# Patient Record
Sex: FEMALE | Race: WHITE
[De-identification: names, ages, dates, MRNs, and addresses within clinical notes are randomized per-mention and may not be internally consistent; named-entity substitution may affect disease eponyms.]

---

## 2017-10-06 ENCOUNTER — HOSPITAL ENCOUNTER (OUTPATIENT)
Dept: HOSPITAL 17 - CLAB | Age: 47
End: 2017-10-06
Attending: INTERNAL MEDICINE
Payer: MEDICARE

## 2017-10-06 DIAGNOSIS — R19.7: Primary | ICD-10-CM

## 2017-10-06 PROCEDURE — 36415 COLL VENOUS BLD VENIPUNCTURE: CPT

## 2017-10-06 PROCEDURE — 83516 IMMUNOASSAY NONANTIBODY: CPT

## 2017-10-06 PROCEDURE — 86140 C-REACTIVE PROTEIN: CPT

## 2017-10-06 PROCEDURE — 82784 ASSAY IGA/IGD/IGG/IGM EACH: CPT

## 2017-10-09 ENCOUNTER — HOSPITAL ENCOUNTER (OUTPATIENT)
Dept: HOSPITAL 17 - CLAB | Age: 47
End: 2017-10-09
Attending: INTERNAL MEDICINE
Payer: MEDICARE

## 2017-10-09 DIAGNOSIS — R19.7: Primary | ICD-10-CM

## 2017-10-09 LAB — C. DIFF EPI 027: (no result)

## 2017-10-09 PROCEDURE — 87493 C DIFF AMPLIFIED PROBE: CPT

## 2017-10-11 LAB
ENDOMYSIUM AB TITR SER: (no result) {TITER}
IGA SERPL-MCNC: 220 MG/DL (ref 81–463)
TISSUE TRANSGLUTAMINASE AB: (no result) U/ML (ref 0–4)

## 2017-11-06 ENCOUNTER — HOSPITAL ENCOUNTER (EMERGENCY)
Dept: HOSPITAL 17 - NEPK | Age: 47
Discharge: HOME | End: 2017-11-06
Payer: MEDICARE

## 2017-11-06 VITALS
HEART RATE: 114 BPM | OXYGEN SATURATION: 99 % | DIASTOLIC BLOOD PRESSURE: 99 MMHG | TEMPERATURE: 98.9 F | RESPIRATION RATE: 16 BRPM | SYSTOLIC BLOOD PRESSURE: 174 MMHG

## 2017-11-06 DIAGNOSIS — L03.116: Primary | ICD-10-CM

## 2017-11-06 DIAGNOSIS — F17.200: ICD-10-CM

## 2017-11-06 DIAGNOSIS — B95.61: ICD-10-CM

## 2017-11-06 PROCEDURE — 10160 PNXR ASPIR ABSC HMTMA BULLA: CPT

## 2017-11-06 PROCEDURE — 87070 CULTURE OTHR SPECIMN AEROBIC: CPT

## 2017-11-06 PROCEDURE — 87186 SC STD MICRODIL/AGAR DIL: CPT

## 2017-11-06 PROCEDURE — 86403 PARTICLE AGGLUT ANTBDY SCRN: CPT

## 2017-11-06 NOTE — PD
HPI


Chief Complaint:  Skin Problem


Time Seen by Provider:  12:36


Travel History


International Travel<30 days:  No


Contact w/Intl Traveler<30days:  No


Traveled to known affect area:  No





History of Present Illness


HPI


47-year-old female with history of diabetes presents for evaluation of an area 

of painful skin redness to the posterior left lower leg.  She first noticed it 

this morning.  She describes it as a throbbing pain which is constant and worse 

with palpation or manipulation.  Denies any fevers, chills, drainage.  She does 

not recall any bug bites or puncture wounds.  She has no other complaints at 

this time.





PFSH


Past Medical History


Bipolar Disorder:  Yes


Anxiety:  No


Depression:  No


Cancer:  No


Cardiovascular Problems:  Yes (HYPERTENSION)


Diabetes:  Yes


Diminished Hearing:  No


Endocrine:  Yes


Gastrointestinal Disorders:  Yes (GALLSTONES)


Genitourinary:  No


Hepatitis:  No


Hiatal Hernia:  No


Hypertension:  Yes


Immune Disorder:  No


Implanted Vascular Access Dvce:  No


Musculoskeletal:  No


Neurologic:  Yes (NEUROPATHY FEET)


Psychiatric:  Yes (BIPOLAR)


Reproductive:  No


Respiratory:  No


Schizophrenia:  Yes


Thyroid Disease:  No


:  3


Para:  3


Tubal Ligation:  Yes





Past Surgical History


AICD:  No


Endocrine Surgery:  Yes (right toe amputations)


Gynecologic Surgery:  Yes (TUBAL LIG., LEFT OVARY REMOVED)


Joint Replacement:  No


Pacemaker:  No


Other Surgery:  Yes (RIGHT GREAT TOE AMPUTATION 9/10/15)





Social History


Alcohol Use:  No


Tobacco Use:  Yes (1/2 PPD)


Substance Use:  No (PER OLD RECORDS PAST HX OF BENZO AND COCAINE)





Allergies-Medications


(Allergen,Severity, Reaction):  


Coded Allergies:  


     No Known Allergies (Verified , 10/4/15)


Reported Meds & Prescriptions





Reported Meds & Active Scripts


Active


Doxycycline Hyclate 100 Mg Cap 100 Mg PO BID


Klonopin (Clonazepam) 0.5 Mg Tab 0.5 Mg PO HS


Reported


Actos 30 mg (Pioglitazone HCl) 30 Mg Tab 1 Tab PO DAILY


Klonopin (Clonazepam) 1 Mg Tab 1 Mg PO Q6HR


Lamotrigine 150 Mg Tab 300 Mg PO HS


Seroquel  mg (Quetiapine Fumarate) 400 Mg Tab 400 Mg PO HS


Multi-Vitamin Daily (Multivitamins) Daily Tab 1 Tab PO DAILY


Norco  mg (Hydrocodone-Acetaminophen  mg) Acetaminophen 325/10 

Hydrocodone Tab 1 Tab PO Q8HR PRN


Rocephin (Ceftriaxone Sodium) 2 Gm Inj 2 Gm IV DAILY


Janumet  (Sitagliptin Phosphate/Metformin HCl) 1 Tab Tab 1 Tab PO BID


     ADMINISTER WITH MEALS


Lisinopril 5 mg (Lisinopril) 5 Mg Tab 20 Mg PO DAILY


Gabapentin 600 Mg Tab 600 Mg PO QID








Review of Systems


General / Constitutional:  No: Fever, Chills


Skin:  Positive Other (skin redness, pain)





Physical Exam


Narrative


GENERAL: Well-developed well-nourished female in no acute distress


SKIN: Warm and dry.  3 cm circular area of mild erythema to the posterior left 

lower leg.  There is a central pustular lesion.  No fluctuance or drainage.


HEAD: Atraumatic. Normocephalic. 


EYES: Pupils equal and round. No scleral icterus. No injection or drainage. 


ENT: No nasal bleeding or discharge.  Mucous membranes pink and moist.


NECK: Trachea midline. No JVD. 


CARDIOVASCULAR: Regular rate and rhythm.  No murmur appreciated.


RESPIRATORY: No accessory muscle use. Clear to auscultation. Breath sounds 

equal bilaterally. 


MUSCULOSKELETAL: No obvious deformities. No clubbing.  No cyanosis.  No edema. 


NEUROLOGICAL: Awake and alert. No obvious cranial nerve deficits.  Motor 

grossly within normal limits. Normal speech.





Data


Data


Last Documented VS





Vital Signs








  Date Time  Temp Pulse Resp B/P (MAP) Pulse Ox O2 Delivery O2 Flow Rate FiO2


 


17 11:32 98.9 114 16 174/99 (124) 99   








Orders





 Orders


Ed Discharge Order (17 12:36)


Sulfamet-Trimeth Ds 800-160 Mg (Bactrim (17 12:45)


Cephalexin (Keflex) (17 12:45)


Acetaminophen (Tylenol) (17 12:45)


Wound Culture And Gram Stain (17 12:36)








[facility]


Medical Decision Making


Medical Screen Exam Complete:  Yes


Emergency Medical Condition:  Yes


Medical Record Reviewed:  Yes


Differential Diagnosis


Cellulitis, abscess, folliculitis, erysipelas, necrotizing fasciitis


Narrative Course


Examination is consistent with mild cellulitis to the left lower leg with a 

central pustular lesion.  After the patient gave verbal consent the skin was 

prepped with Betadine the pustular lesion was unroofed with an 18-gauge needle.

  A wound culture was performed.  She is being discharged with doxycycline.





Diagnosis





 Primary Impression:  


 Cellulitis of left lower leg





***Additional Instructions:  


Antibiotics as prescribed.  Warm compresses several times a day 15 minutes at a 

time.  Follow-up with primary care physician and return for any acutely new or 

worsening symptoms.


***Med/Other Pt SpecificInfo:  Prescription(s) given


Scripts


Doxycycline Hyclate (Doxycycline Hyclate) 100 Mg Cap


100 MG PO BID for Infection, #20 CAP 0 Refills


   Prov: Kaden Mercado MD         17


Disposition:  01 DISCHARGE HOME


Condition:  Stable











[place] 2017 12:39

## 2017-12-18 ENCOUNTER — HOSPITAL ENCOUNTER (OUTPATIENT)
Dept: HOSPITAL 17 - CLAB | Age: 47
End: 2017-12-18
Payer: MEDICARE

## 2017-12-18 DIAGNOSIS — M06.9: ICD-10-CM

## 2017-12-18 DIAGNOSIS — M86.671: Primary | ICD-10-CM

## 2017-12-18 PROCEDURE — 85652 RBC SED RATE AUTOMATED: CPT

## 2017-12-18 PROCEDURE — 86140 C-REACTIVE PROTEIN: CPT

## 2017-12-18 PROCEDURE — 36415 COLL VENOUS BLD VENIPUNCTURE: CPT

## 2018-06-19 ENCOUNTER — HOSPITAL ENCOUNTER (OUTPATIENT)
Dept: HOSPITAL 17 - NEPC | Age: 48
Setting detail: OBSERVATION
LOS: 1 days | Discharge: HOME | End: 2018-06-20
Attending: INTERNAL MEDICINE | Admitting: INTERNAL MEDICINE
Payer: MEDICARE

## 2018-06-19 VITALS — WEIGHT: 286.6 LBS | BODY MASS INDEX: 43.44 KG/M2 | HEIGHT: 68 IN

## 2018-06-19 VITALS
HEART RATE: 106 BPM | TEMPERATURE: 98.7 F | DIASTOLIC BLOOD PRESSURE: 89 MMHG | OXYGEN SATURATION: 94 % | SYSTOLIC BLOOD PRESSURE: 153 MMHG | RESPIRATION RATE: 26 BRPM

## 2018-06-19 VITALS
HEART RATE: 89 BPM | SYSTOLIC BLOOD PRESSURE: 146 MMHG | DIASTOLIC BLOOD PRESSURE: 94 MMHG | OXYGEN SATURATION: 94 % | TEMPERATURE: 98 F | RESPIRATION RATE: 17 BRPM

## 2018-06-19 VITALS
SYSTOLIC BLOOD PRESSURE: 143 MMHG | RESPIRATION RATE: 18 BRPM | HEART RATE: 98 BPM | OXYGEN SATURATION: 97 % | DIASTOLIC BLOOD PRESSURE: 74 MMHG

## 2018-06-19 VITALS — OXYGEN SATURATION: 97 %

## 2018-06-19 VITALS — HEART RATE: 82 BPM

## 2018-06-19 DIAGNOSIS — M47.894: ICD-10-CM

## 2018-06-19 DIAGNOSIS — Z79.899: ICD-10-CM

## 2018-06-19 DIAGNOSIS — F17.200: ICD-10-CM

## 2018-06-19 DIAGNOSIS — F31.9: ICD-10-CM

## 2018-06-19 DIAGNOSIS — E11.9: ICD-10-CM

## 2018-06-19 DIAGNOSIS — R00.0: ICD-10-CM

## 2018-06-19 DIAGNOSIS — R07.89: Primary | ICD-10-CM

## 2018-06-19 DIAGNOSIS — I10: ICD-10-CM

## 2018-06-19 DIAGNOSIS — F20.9: ICD-10-CM

## 2018-06-19 LAB
BASOPHILS # BLD AUTO: 0 TH/MM3 (ref 0–0.2)
BASOPHILS NFR BLD: 0.4 % (ref 0–2)
BUN SERPL-MCNC: 11 MG/DL (ref 7–18)
CALCIUM SERPL-MCNC: 8.8 MG/DL (ref 8.5–10.1)
CHLORIDE SERPL-SCNC: 97 MEQ/L (ref 98–107)
CREAT SERPL-MCNC: 0.89 MG/DL (ref 0.5–1)
EOSINOPHIL # BLD: 0 TH/MM3 (ref 0–0.4)
EOSINOPHIL NFR BLD: 0.2 % (ref 0–4)
ERYTHROCYTE [DISTWIDTH] IN BLOOD BY AUTOMATED COUNT: 13.6 % (ref 11.6–17.2)
GFR SERPLBLD BASED ON 1.73 SQ M-ARVRAT: 68 ML/MIN (ref 89–?)
GLUCOSE SERPL-MCNC: 381 MG/DL (ref 74–106)
HCO3 BLD-SCNC: 29.1 MEQ/L (ref 21–32)
HCT VFR BLD CALC: 45.7 % (ref 35–46)
HGB BLD-MCNC: 15.6 GM/DL (ref 11.6–15.3)
INR PPP: 0.9 RATIO
LYMPHOCYTES # BLD AUTO: 2.7 TH/MM3 (ref 1–4.8)
LYMPHOCYTES NFR BLD AUTO: 28.7 % (ref 9–44)
MCH RBC QN AUTO: 32.4 PG (ref 27–34)
MCHC RBC AUTO-ENTMCNC: 34.1 % (ref 32–36)
MCV RBC AUTO: 95.2 FL (ref 80–100)
MONOCYTE #: 0.5 TH/MM3 (ref 0–0.9)
MONOCYTES NFR BLD: 4.9 % (ref 0–8)
NEUTROPHILS # BLD AUTO: 6.3 TH/MM3 (ref 1.8–7.7)
NEUTROPHILS NFR BLD AUTO: 65.8 % (ref 16–70)
PLATELET # BLD: 249 TH/MM3 (ref 150–450)
PMV BLD AUTO: 8.3 FL (ref 7–11)
PROTHROMBIN TIME: 9.5 SEC (ref 9.8–11.6)
RBC # BLD AUTO: 4.8 MIL/MM3 (ref 4–5.3)
SODIUM SERPL-SCNC: 134 MEQ/L (ref 136–145)
TROPONIN I SERPL-MCNC: (no result) NG/ML (ref 0.02–0.05)
TROPONIN I SERPL-MCNC: (no result) NG/ML (ref 0.02–0.05)
WBC # BLD AUTO: 9.5 TH/MM3 (ref 4–11)

## 2018-06-19 PROCEDURE — 78452 HT MUSCLE IMAGE SPECT MULT: CPT

## 2018-06-19 PROCEDURE — 80048 BASIC METABOLIC PNL TOTAL CA: CPT

## 2018-06-19 PROCEDURE — 93005 ELECTROCARDIOGRAM TRACING: CPT

## 2018-06-19 PROCEDURE — 85610 PROTHROMBIN TIME: CPT

## 2018-06-19 PROCEDURE — 85379 FIBRIN DEGRADATION QUANT: CPT

## 2018-06-19 PROCEDURE — A9502 TC99M TETROFOSMIN: HCPCS

## 2018-06-19 PROCEDURE — 85025 COMPLETE CBC W/AUTO DIFF WBC: CPT

## 2018-06-19 PROCEDURE — 82948 REAGENT STRIP/BLOOD GLUCOSE: CPT

## 2018-06-19 PROCEDURE — 71275 CT ANGIOGRAPHY CHEST: CPT

## 2018-06-19 PROCEDURE — 82550 ASSAY OF CK (CPK): CPT

## 2018-06-19 PROCEDURE — 85730 THROMBOPLASTIN TIME PARTIAL: CPT

## 2018-06-19 PROCEDURE — 99285 EMERGENCY DEPT VISIT HI MDM: CPT

## 2018-06-19 PROCEDURE — 84484 ASSAY OF TROPONIN QUANT: CPT

## 2018-06-19 PROCEDURE — 71045 X-RAY EXAM CHEST 1 VIEW: CPT

## 2018-06-19 PROCEDURE — G0378 HOSPITAL OBSERVATION PER HR: HCPCS

## 2018-06-19 PROCEDURE — 93017 CV STRESS TEST TRACING ONLY: CPT

## 2018-06-19 RX ADMIN — Medication SCH ML: at 23:05

## 2018-06-19 RX ADMIN — PREGABALIN SCH MG: 25 CAPSULE ORAL at 23:02

## 2018-06-19 NOTE — RADRPT
EXAM DATE:  6/19/2018 5:30 PM EDT

AGE/SEX:        48 years / Female



INDICATIONS:  Chest pain with bilateral feet swelling.



CLINICAL DATA:  This is the patient's initial encounter. Patient reports that signs and symptoms have
 been present for 2 days and indicates a pain score of 8/10. 

                                                                          

MEDICAL/SURGICAL HISTORY:       Hypertension.  Diabetes mellitus type II. Smoker.  None.



COMPARISON:      [facility], CHEST SINGLE AP, 9/4/2015.  . 





FINDINGS:  

A single AP view of the chest demonstrates the lungs to be symmetrically aerated without evidence of 
mass, infiltrate or effusion.  The cardiomediastinal contours are unremarkable.  Osseous structures a
re intact.  





CONCLUSION: 

No acute intrathoracic disease. Stable examination.



Electronically signed by: Dennis Cornelius MD  6/19/2018 5:33 PM EDT

## 2018-06-19 NOTE — PD
HPI


Chief Complaint:  Chest Pain


Time Seen by Provider:  16:13


Travel History


International Travel<30 days:  No


Contact w/Intl Traveler<30days:  No


Traveled to known affect area:  No





History of Present Illness


HPI


The patient was seen and examined in the presence of the nurse.  This patient 

complains of sternal pressure and heaviness.  Duration 2 days.  Severity is 

moderate.  No alleviating factors.  No exacerbating factors.  It is not 

exertional.  She has no history of cardiac disease.  She is a hypertensive 

diabetic however.  No history of stress testing





PFSH


Past Medical History


Bipolar Disorder:  Yes


Anxiety:  No


Depression:  No


Cancer:  No


Cardiovascular Problems:  Yes (HYPERTENSION)


Diabetes:  Yes


Diminished Hearing:  No


Endocrine:  Yes


Gastrointestinal Disorders:  Yes (GALLSTONES)


Genitourinary:  No


Hepatitis:  No


Hiatal Hernia:  No


Hypertension:  Yes


Immune Disorder:  No


Implanted Vascular Access Dvce:  No


Musculoskeletal:  No


Neurologic:  Yes (NEUROPATHY FEET)


Psychiatric:  Yes (BIPOLAR)


Reproductive:  No


Respiratory:  No


Schizophrenia:  Yes


Thyroid Disease:  No


Pregnant?:  Not Pregnant


:  3


Para:  3


Tubal Ligation:  Yes





Past Surgical History


AICD:  No


Endocrine Surgery:  Yes (right toe amputations)


Gynecologic Surgery:  Yes (TUBAL LIG., LEFT OVARY REMOVED)


Joint Replacement:  No


Pacemaker:  No


Other Surgery:  Yes (RIGHT GREAT TOE AMPUTATION 9/10/15)





Social History


Alcohol Use:  No


Tobacco Use:  Yes (1/2 PPD)


Substance Use:  No (PER OLD RECORDS PAST HX OF BENZO AND COCAINE)





Allergies-Medications


(Allergen,Severity, Reaction):  


Coded Allergies:  


     No Known Allergies (Verified  Adverse Reaction, Unknown, 18)


Reported Meds & Prescriptions





Reported Meds & Active Scripts


Active


Reported


Multiple Vitamin 1 Tab 1 Tab PO DAILY


Janumet (Sitagliptin-Metformin) 50-1,000 Mg Tab 1 Tab PO BID


Actos (Pioglitazone HCl) 30 Mg Tab 30 Mg PO DAILY


Lisinopril 40 Mg Tab 40 Mg PO DAILY








Review of Systems


General / Constitutional:  No: Fever


Eyes:  No: Visual changes


HENT:  No: Headaches


Cardiovascular:  Positive: Chest Pain or Discomfort


Respiratory:  No: Shortness of Breath


Gastrointestinal:  No: Abdominal Pain


Genitourinary:  No: Dysuria


Musculoskeletal:  No: Pain


Skin:  No Rash


Neurologic:  No: Weakness


Psychiatric:  No: Depression


Endocrine:  No: Polydipsia


Hematologic/Lymphatic:  No: Easy Bruising





Physical Exam


Narrative


GENERAL: Well-nourished, well-developed patient in no apparent distress.


SKIN: Focused skin assessment reveals no rash and nodules. Skin is Warm and dry.


HEAD: Atraumatic. Normocephalic. 


EYES: Pupils equal and round. No scleral icterus. No injection or drainage. 


ENT: No nasal bleeding or discharge.  Mucous membranes pink and moist.


NECK: Trachea midline. No JVD. 


CARDIOVASCULAR: Regular rate and rhythm.  No murmur appreciated.


RESPIRATORY: No accessory muscle use. Clear to auscultation. Breath sounds 

equal bilaterally. 


GASTROINTESTINAL: Abdomen soft, non-tender, nondistended. Hepatic and splenic 

margins not palpable. 


MUSCULOSKELETAL: Has a partial amputation of the right great toe. No clubbing.  

No cyanosis.  No edema. 


NEUROLOGICAL: Awake and alert. No obvious cranial nerve deficits.  Motor 

grossly within normal limits. Normal speech.


PSYCHIATRIC: Appropriate mood and affect; insight and judgment normal.





Data


Data


Last Documented VS





Vital Signs








  Date Time  Temp Pulse Resp B/P (MAP) Pulse Ox O2 Delivery O2 Flow Rate FiO2


 


18 16:03 98.7 106 26 153/89 (110) 94   








Orders





 Orders


Electrocardiogram (18 16:53)


Basic Metabolic Panel (Bmp) (18 16:53)


Ckmb (Isoenzyme) Profile (18 16:53)


Complete Blood Count With Diff (18 16:53)


Prothrombin Time / Inr (Pt) (18 16:53)


Act Partial Throm Time (Ptt) (18 16:53)


Troponin I (18 16:53)


Chest, Single Ap (18 16:53)


Ecg Monitoring (18 16:53)


Iv Access Insert/Monitor (18 16:53)


Oximetry (18 16:53)


Aspirin (Aspirin) (18 17:00)


Sodium Chloride 0.9% Flush (Ns Flush) (18 17:00)


Admit Order (Ed Use Only) (18 18:27)





Labs





Laboratory Tests








Test


  18


16:50


 


White Blood Count 9.5 TH/MM3 


 


Red Blood Count 4.80 MIL/MM3 


 


Hemoglobin 15.6 GM/DL 


 


Hematocrit 45.7 % 


 


Mean Corpuscular Volume 95.2 FL 


 


Mean Corpuscular Hemoglobin 32.4 PG 


 


Mean Corpuscular Hemoglobin


Concent 34.1 % 


 


 


Red Cell Distribution Width 13.6 % 


 


Platelet Count 249 TH/MM3 


 


Mean Platelet Volume 8.3 FL 


 


Neutrophils (%) (Auto) 65.8 % 


 


Lymphocytes (%) (Auto) 28.7 % 


 


Monocytes (%) (Auto) 4.9 % 


 


Eosinophils (%) (Auto) 0.2 % 


 


Basophils (%) (Auto) 0.4 % 


 


Neutrophils # (Auto) 6.3 TH/MM3 


 


Lymphocytes # (Auto) 2.7 TH/MM3 


 


Monocytes # (Auto) 0.5 TH/MM3 


 


Eosinophils # (Auto) 0.0 TH/MM3 


 


Basophils # (Auto) 0.0 TH/MM3 


 


CBC Comment DIFF FINAL 


 


Differential Comment  


 


Prothrombin Time 9.5 SEC 


 


Prothromb Time International


Ratio 0.9 RATIO 


 


 


Activated Partial


Thromboplast Time 27.3 SEC 


 


 


Blood Urea Nitrogen 11 MG/DL 


 


Creatinine 0.89 MG/DL 


 


Random Glucose 381 MG/DL 


 


Calcium Level 8.8 MG/DL 


 


Sodium Level 134 MEQ/L 


 


Potassium Level 4.4 MEQ/L 


 


Chloride Level 97 MEQ/L 


 


Carbon Dioxide Level 29.1 MEQ/L 


 


Anion Gap 8 MEQ/L 


 


Estimat Glomerular Filtration


Rate 68 ML/MIN 


 


 


Total Creatine Kinase 97 U/L 


 


Troponin I


  LESS THAN 0.02


NG/ML











MDM


Medical Decision Making


Medical Screen Exam Complete:  Yes


Emergency Medical Condition:  Yes


Medical Record Reviewed:  Yes


Differential Diagnosis


Differential diagnosis includes MI, angina, pericarditis, pleurisy, GERD, 

anxiety.


Narrative Course


I have reviewed the patient's electronic medical record.





I gave her an aspirin


IV placed and labs sent


I reviewed her chest x-ray which is negative


I reviewed her EKG which shows sinus rhythm and no ST elevation





Lab studies are normal including metabolic studies and CBC and cardiac enzymes


She has multiple risk factors including hypertension and diabetes.  She will be 

a 23 hour observation in the chest pain center in order to rule out cardiac 

cause of her symptoms.





Diagnosis





 Primary Impression:  


 Chest pain


 Qualified Codes:  R07.9 - Chest pain, unspecified





Admitting Information


Admitting Physician Requests:  Observation











Reid Person MD 2018 17:20

## 2018-06-20 VITALS — HEART RATE: 83 BPM

## 2018-06-20 VITALS — HEART RATE: 86 BPM

## 2018-06-20 VITALS
SYSTOLIC BLOOD PRESSURE: 179 MMHG | TEMPERATURE: 98.2 F | DIASTOLIC BLOOD PRESSURE: 96 MMHG | HEART RATE: 90 BPM | OXYGEN SATURATION: 95 % | RESPIRATION RATE: 18 BRPM

## 2018-06-20 VITALS
RESPIRATION RATE: 18 BRPM | OXYGEN SATURATION: 96 % | SYSTOLIC BLOOD PRESSURE: 168 MMHG | HEART RATE: 92 BPM | TEMPERATURE: 98.8 F | DIASTOLIC BLOOD PRESSURE: 97 MMHG

## 2018-06-20 VITALS
SYSTOLIC BLOOD PRESSURE: 147 MMHG | DIASTOLIC BLOOD PRESSURE: 87 MMHG | TEMPERATURE: 98.5 F | RESPIRATION RATE: 18 BRPM | HEART RATE: 87 BPM | OXYGEN SATURATION: 95 %

## 2018-06-20 VITALS
HEART RATE: 92 BPM | SYSTOLIC BLOOD PRESSURE: 139 MMHG | OXYGEN SATURATION: 95 % | TEMPERATURE: 98.2 F | RESPIRATION RATE: 16 BRPM | DIASTOLIC BLOOD PRESSURE: 91 MMHG

## 2018-06-20 VITALS — HEART RATE: 93 BPM

## 2018-06-20 VITALS
TEMPERATURE: 98.2 F | DIASTOLIC BLOOD PRESSURE: 90 MMHG | HEART RATE: 88 BPM | SYSTOLIC BLOOD PRESSURE: 145 MMHG | RESPIRATION RATE: 18 BRPM | OXYGEN SATURATION: 94 %

## 2018-06-20 VITALS — HEART RATE: 91 BPM

## 2018-06-20 LAB — TROPONIN I SERPL-MCNC: (no result) NG/ML (ref 0.02–0.05)

## 2018-06-20 RX ADMIN — INSULIN ASPART SCH: 100 INJECTION, SOLUTION INTRAVENOUS; SUBCUTANEOUS at 12:41

## 2018-06-20 RX ADMIN — INSULIN ASPART SCH: 100 INJECTION, SOLUTION INTRAVENOUS; SUBCUTANEOUS at 08:00

## 2018-06-20 RX ADMIN — INSULIN ASPART SCH: 100 INJECTION, SOLUTION INTRAVENOUS; SUBCUTANEOUS at 18:29

## 2018-06-20 RX ADMIN — Medication SCH ML: at 08:11

## 2018-06-20 RX ADMIN — PREGABALIN SCH MG: 25 CAPSULE ORAL at 12:57

## 2018-06-20 RX ADMIN — PREGABALIN SCH MG: 25 CAPSULE ORAL at 08:11

## 2018-06-20 RX ADMIN — PREGABALIN SCH MG: 25 CAPSULE ORAL at 18:29

## 2018-06-20 NOTE — EKG
Date Performed: 06/19/2018       Time Performed: 23:12:50

 

PTAGE:      48 years

 

EKG:      Sinus rhythm 

 

 NORMAL ECG

 

PREVIOUS TRACING       : 06/19/2018 20.12 Since previous tracing, no significant change noted

 

DOCTOR:   Ankit Ward  Interpretating Date/Time  06/20/2018 14:12:46

## 2018-06-20 NOTE — HHI.HP
HPI


Primary Care Physician


Alban Felder MD


Chief Complaint


Chest pain


History of Present Illness


48-year-old female with history of type 2 diabetes, hypertension, and current 

smoker presents emergency room for further evaluation nonexertional chest pain.

  Onset 3 days intermittently, increasing in intensity and severity.  Location 

substernal.  Characterized as pressure.  Radiation to mid back.  Duration 30-60 

seconds.  Associated symptoms of dyspnea.  Denies nausea, vomiting, 

diaphoresis.  Denies similar pain in the past.  No known precipitating or 

relieving factors.  No known coronary artery disease.  No recent illness, fever

, chills, or injury.





Review of Systems


General: No fatigue, weakness, fever, chills, or recent illness.


HEENT: No HA, no vision changes, no nasal congestion or drainage, no dysphasia


CV: As stated above.  No current chest pain or pressure no CP.


RESP: No SOB, cough, wheeze, or recent URI.  No history of asthma or COPD.  

Current smoker


GI: No nausea, vomiting, bowel changes, diarrhea, constipation, pain, or 

distention.  No unintentional weight gain or weight loss.


: No dysuria, urgency, frequency


EXT: No lower leg edema, history of bilateral neuropathy


MS: No discomfort, injury or change in ROM


NEURO: No dizziness, difficulty with balance, LOC, motor/sensory deficits


PSYCH: No anxiety, depression, or suicidal ideation


SKIN: No rashes, no concerning lesions





Past Family Social History


Allergies:  


Coded Allergies:  


     No Known Allergies (Verified  Allergy, Unknown, 18)


Past Medical History


Type 2 diabetes, hypertension, neuropathy, bipolar disorder, gallstones


Past Surgical History


Tubal ligation, cholecystectomy, lumbar surgery 2, right toe amputation, left 

ovarian removal


Reported Medications





Reported Meds & Active Scripts


Active


Reported


Tramadol (Tramadol HCl) 50 Mg Tab 50 Mg PO Q8H PRN


Lyrica (Pregabalin) 50 Mg Cap 50 Mg PO TID


Trileptal (Oxcarbazepine) 600 Mg Tab 600 Mg PO BID


Omeprazole 20 Mg Tab 20 Mg PO DAILY


Fanapt (Iloperidone) 6 Mg Tab 1 Tab PO HS


Glipizide 10 Mg Tab 10 Mg PO BIDAC


     Take 30 minutes before a meal


Multiple Vitamin 1 Tab 1 Tab PO DAILY


Janumet (Sitagliptin-Metformin) 50-1,000 Mg Tab 1 Tab PO BID


Actos (Pioglitazone HCl) 30 Mg Tab 30 Mg PO DAILY


Lisinopril 40 Mg Tab 40 Mg PO DAILY


Active Ordered Medications





Current Medications








 Medications


  (Trade)  Dose


 Ordered  Sig/Kun


 Route  Start Time


 Stop Time Status Last Admin


 


  (NS Flush)  2 ml  UNSCH  PRN


 IV FLUSH  18 18:45


     


 


 


  (NS Flush)  2 ml  BID


 IV FLUSH  18 21:00


    18 08:11


 


 


  (Lyrica)  50 mg  TID


 PO  18 22:30


    18 08:11


 


 


  (Morphine Inj)  4 mg  Q4H  PRN


 IV  18 23:00


     


 


 


  (Tylenol)  325 mg  Q6H  PRN


 PO  18 23:30


     


 


 


  (Ultram)  50 mg  Q8H  PRN


 PO  18 23:30


     


 


 


  (D50w (Vial) Inj)  50 ml  UNSCH  PRN


 IV PUSH  18 07:30


     


 


 


  (Glucagon Inj)  1 mg  UNSCH  PRN


 OTHER  18 07:30


     


 


 


  (NovoLOG


 SUPPLEMENTAL


 SCALE)  1  ACHS SLIDING  SCALE


 SQ  18 08:00


     


 








Social History


Known type 2 diabetes and hypertension.  No known hyperlipidemia, not on statin 

therapy.  No known coronary artery disease.


Current smoker decreased from 1.5 packs daily to quarter pack daily.  Denies 

any alcohol or illegal drug use.





Past cardiac testing


None





Physical Exam


Vital Signs





Vital Signs








  Date Time  Temp Pulse Resp B/P (MAP) Pulse Ox O2 Delivery O2 Flow Rate FiO2


 


18 08:17 98.2 90 18 179/96 (123) 95   


 


18 03:48 98.2 92 16 139/91 (107) 95   


 


18 03:46  86      


 


18 00:19   18     


 


18 00:14 98.2 88 18 145/90 (108) 94   


 


18 23:17  82      


 


18 21:17     97   


 


18 20:17 98.0 89 17 146/94 (111) 94   


 


18 19:13        


 


18 18:00  98 18 143/74 (97) 97   


 


18 16:30      Room Air  


 


18 16:03 98.7 106 26 153/89 (110) 94   








Physical Exam


GENERAL: Alert WN, WD, NAD, pleasant, morbidly obese  female who 

appears older than stated age


HEAD: NC, AT


EYES: Sclera clear, conjunctiva without injection, pupils equal and round


ENT: Mucous membranes pink and moist


CV: RRR, without murmur, rub, or gallop, no JVD, no S3-S4.


RESP: Clear lungs throughout bilateral, no crackles, wheeze, rhonchi, 

symmetrical chest rise, nonlabored, able to speak in full sentences


ABD: Soft, NT, ND, no masses, positive bowel tones, large, obese


EXT: Pulses +2x4, +1 pitting bilateral lower extremity edema, right toe 

amputation surgical scar


MS: Normal tone x4 extremities, nontender, no obvious deformities, full range 

of motion


NEURO: CN II through CN XII grossly intact, motor strength 5/5


PSYCH: A+O x3, pleasant affect, appropriate speech, mood, insight and judgment


SKIN: Normal turgor, normal texture


Laboratory





Laboratory Tests








Test


  18


16:50 18


20:20 18


23:08


 


White Blood Count 9.5   


 


Red Blood Count 4.80   


 


Hemoglobin 15.6   


 


Hematocrit 45.7   


 


Mean Corpuscular Volume 95.2   


 


Mean Corpuscular Hemoglobin 32.4   


 


Mean Corpuscular Hemoglobin


Concent 34.1 


  


  


 


 


Red Cell Distribution Width 13.6   


 


Platelet Count 249   


 


Mean Platelet Volume 8.3   


 


Neutrophils (%) (Auto) 65.8   


 


Lymphocytes (%) (Auto) 28.7   


 


Monocytes (%) (Auto) 4.9   


 


Eosinophils (%) (Auto) 0.2   


 


Basophils (%) (Auto) 0.4   


 


Neutrophils # (Auto) 6.3   


 


Lymphocytes # (Auto) 2.7   


 


Monocytes # (Auto) 0.5   


 


Eosinophils # (Auto) 0.0   


 


Basophils # (Auto) 0.0   


 


CBC Comment DIFF FINAL   


 


Differential Comment    


 


Prothrombin Time 9.5   


 


Prothromb Time International


Ratio 0.9 


  


  


 


 


Activated Partial


Thromboplast Time 27.3 


  


  


 


 


Blood Urea Nitrogen 11   


 


Creatinine 0.89   


 


Random Glucose 381   


 


Calcium Level 8.8   


 


Sodium Level 134   


 


Potassium Level 4.4   


 


Chloride Level 97   


 


Carbon Dioxide Level 29.1   


 


Anion Gap 8   


 


Estimat Glomerular Filtration


Rate 68 


  


  


 


 


Total Creatine Kinase 97  68  72 


 


Troponin I LESS THAN 0.02  LESS THAN 0.02  LESS THAN 0.02 








Result Diagram:  


18 1650                                                                   

             18 1650





Imaging





Last 48 hours Impressions








CT Angiography 18 0000 Signed





Impressions: 





 CONCLUSION:





 1.  No pulmonary embolus identified.





 2.  The lungs are clear.





 3.  Mild degenerative changes within the thoracic spine.





  





 


 


Chest X-Ray 18 1440 Signed





Impressions: 





 CONCLUSION: 





 No acute intrathoracic disease. Stable examination.





  





 








Course


EKG


Normal sinus rhythm, no ST T-segment changes





Caprini VTE Risk Assessment


Caprini VTE Risk Assessment:  No/Low Risk (score <= 1)


Caprini Risk Assessment Model











 Point Value = 1          Point Value = 2  Point Value = 3  Point Value = 5


 


Age 41-60


Minor surgery


BMI > 25 kg/m2


Swollen legs


Varicose veins


Pregnancy or postpartum


History of unexplained or recurrent


   spontaneous 


Oral contraceptives or hormone


   replacement


Sepsis (< 1 month)


Serious lung disease, including


   pneumonia (< 1 month)


Abnormal pulmonary function


Acute myocardial infarction


Congestive heart failure (< 1 month)


History of inflammatory bowel disease


Medical patient at bed rest Age 61-74


Arthroscopic surgery


Major open surgery (> 45 min)


Laparoscopic surgery (> 45 min)


Malignancy


Confined to bed (> 72 hours)


Immobilizing plaster cast


Central venous access Age >= 75


History of VTE


Family history of VTE


Factor V Leiden


Prothrombin 24784I


Lupus anticoagulant


Anticardiolipin antibodies


Elevated serum homocysteine


Heparin-induced thrombocytopenia


Other congenital or acquired


   thrombophilia Stroke (< 1 month)


Elective arthroplasty


Hip, pelvis, or leg fracture


Acute spinal cord injury (< 1 month)








Prophylaxis Regimen











   Total Risk


Factor Score Risk Level Prophylaxis Regimen


 


0-1      Low Early ambulation


 


2 Moderate Order ONE of the following:


*Sequential Compression Device (SCD)


*Heparin 5000 units SQ BID


 


3-4 Higher Order ONE of the following medications:


*Heparin 5000 units SQ TID


*Enoxaparin/Lovenox 40 mg SQ daily (WT < 150 kg, CrCl > 30 mL/min)


*Enoxaparin/Lovenox 30 mg SQ daily (WT < 150 kg, CrCl > 10-29 mL/min)


*Enoxaparin/Lovenox 30 mg SQ BID (WT < 150 kg, CrCl > 30 mL/min)


AND/OR


*Sequential Compression Device (SCD)


 


5 or more Highest Order ONE of the following medications:


*Heparin 5000 units SQ TID (Preferred with Epidurals)


*Enoxaparin/Lovenox 40 mg SQ daily (WT < 150 kg, CrCl > 30 mL/min)


*Enoxaparin/Lovenox 30 mg SQ daily (WT < 150 kg, CrCl > 10-29 mL/min)


*Enoxaparin/Lovenox 30 mg SQ BID (WT < 150 kg, CrCl > 30 mL/min)


AND


*Sequential Compression Device (SCD)











Assessment and Plan


Assessment and Plan


#1 Chest pain-admitted chest pain center.  Ruled out with 3 sets of EKGs and 

cardiac enzymes.  Monitor on telemetry overnight.  Seen and evaluated by Dr. Ankit Ward.  Obtain d-dimer prior to Lexiscan. Will order CT pulmonary 

angiogram depending on d-dimer results. After PE ruled out plans to complete 

Lexiscan.  Symptoms concerning for angina, especially with risk factors of 

uncontrolled type 2 diabetes, hypertension, and current smoker.


#2 Type II diabetes-SSI moderate dose coverage,


#3 Hypertension-continue lisinopril


#4 Bipolar disorder-continue home medications.


#5 Tobacco use-strongly encouraged and stressed importance of tobacco 

cessation. Praised efforts of reducing daily amount of smoking. Instructed to 

quit smoking completely.











Anna Herndon 2018 08:25

## 2018-06-20 NOTE — HHI.DCPOC
Discharge Care Plan


Diagnosis:  


(1) Atypical chest pain


(2) Type 2 diabetes mellitus


(3) Tobacco abuse


(4) Hypertension


Goals to Promote Your Health


* To prevent worsening of your condition and complications


* To maintain your health at the optimal level


Directions to Meet Your Goals


*** Take your medications as prescribed


*** Follow your dietary instruction


*** Follow activity as directed








*** Keep your appointments as scheduled


*** Take your immunizations and boosters as scheduled


*** If your symptoms worsen call your PCP, if no PCP go to Urgent Care Center 

or Emergency Room***


*** Smoking is Dangerous to Your Health. Avoid second hand smoke***


***Call the 24-hour hour crisis hotline for domestic abuse at 1-960.630.3040***











Anna Herndon Jun 20, 2018 17:20

## 2018-06-20 NOTE — RADRPT
EXAM DATE:  6/20/2018 11:35 AM EDT

AGE/SEX:        48 years / Female



INDICATIONS:  Chest pain for several days



CLINICAL DATA:  This is the patient's initial encounter. Patient reports that signs and symptoms have
 been present for 1 day and indicates a pain score of 6/10. 

                                                                          

MEDICAL/SURGICAL HISTORY:   Hypertension.  Diabetes. Tubal ligation.



RADIATION DOSE:  10.7 CTDI (mGy)









COMPARISON:      No prior exams available for comparison. 





TECHNIQUE:  Volumetric scanning was performed using a multi-row detector CT scanner during bolus infu
jayna of 73 ml Omnipaque 350 (iohexol)  nonionic water-soluble contrast as a single exam dose. The wilmer
a was post processed with a variety of visualization algorithms including full volume maximum intensi
ty projection and sliding thin slab reformation.  Using automated exposure control and adjustment of 
the mA and/or kV according to patient size, radiation dose was kept as low as reasonably achievable t
o obtain optimal diagnostic quality images.  DICOM format image data is available electronically for 
review and comparison. 



FINDINGS:  

The examination is of excellent diagnostic quality. No pulmonary embolus is seen.



The heart is normal in size. There is no pericardial effusion. No significant hilar, mediastinal or a
xillary adenopathy is evident.



Imaging through the pulmonary parenchyma demonstrates the lungs to be clear. No pleural effusion is s
een.



There are degenerative changes throughout the thoracic spine. No destructive lesion is noted.



The limited portions of upper abdomen visualized are unremarkable.



CONCLUSION:

1.  No pulmonary embolus identified.

2.  The lungs are clear.

3.  Mild degenerative changes within the thoracic spine.



Electronically signed by: Edward Price MD  6/20/2018 11:38 AM EDT

## 2018-06-20 NOTE — EKG
Date Performed: 06/19/2018       Time Performed: 16:18:34

 

PTAGE:      48 years

 

EKG:      SINUS TACHYCARDIA POSSIBLE ANTERIOR MYOCARDIAL INFARCTION ABNORMAL RHYTHM ECG 

 

NO PREVIOUS TRACING            

 

DOCTOR:   Dayday Meek  Interpretating Date/Time  06/20/2018 07:31:24

## 2018-06-20 NOTE — RADRPT
EXAM DATE:  6/20/2018 5:03 PM EDT

AGE/SEX:        48 years / Female



INDICATIONS:Angina. . Substernal chest pressure radiating to the back with dyspnea for 3 days.   

 

CLINICAL DATA:  This is the patient's initial encounter. Patient reports that signs and symptoms have
 been present for 3 days and indicates a pain score of 3/10. 

                                                                          

MEDICAL/SURGICAL HISTORY:       Diabetes mellitus type II.  Hypertension. Smoker.  Tubal ligation.



COMPARISON:      No prior exams available for comparison. 



 

DOSE:  35 mCi Tc 99m Myoview at stress

              11  mCi Tc99m-Myoview at rest

              0.4 mg Lexiscan



STRESS SYMPTOMS: Dyspnea and weird feeling.







EJECTION FRACTION:  62 %



TECHNIQUE:  The patient underwent pharmacologic stress with infusion of prescribed dose.  Continuous 
ECG tracing was monitored during stress.  Gated SPECT imaging was performed after stress and conventi
onal SPECT imaging was performed at rest.  The examination was performed on a SPECT/CT scanner, both 
attenuation and non-corrected datasets were reviewed.



FINDINGS:  

Distribution:  The maximum perfused segment at stress is in the anterior wall.

Perfusion Study:   The pattern of perfusion at stress is within normal limits, with regional variatio
ns perfusion within 25%. Apparent perfusion at stress and rest is unchanged without evidence of redis
tribution.   

Gated Study:  There are intact wall motion and wall thickening without hypokinetic or dyskinetic segm
ents.  The ejection fraction is calculated at 62%.  



RISK CATEGORY:  Low (<1% Annual Motality Rate)



CONCLUSION: 

1.  No evidence of stress-induced ischemia.

2.  Intact wall motion with 63% ejection fraction.



Electronically signed by: Kurt Jensen MD  6/20/2018 5:07 PM EDT

## 2018-06-20 NOTE — EKG
Date Performed: 06/19/2018       Time Performed: 20:12:57

 

PTAGE:      48 years

 

EKG:      Sinus rhythm 

 

 LOW QRS VOLTAGE IN PRECORDIAL LEADS BORDERLINE ECG

 

PREVIOUS TRACING       : 06/19/2018 16.18 Since previous tracing, no significant change noted

 

DOCTOR:   Ankit Ward  Interpretating Date/Time  06/20/2018 14:13:44

## 2018-06-22 NOTE — TR
Date Performed: 06/20/2018       Time Performed: 15:03:00

 

DOCTOR:      Ankit Ward 

 

DRUG LIST:     

CLINICAL HISTORY:     

REASON FOR TEST:     

REASON FOR ENDING:     

OBSERVATION:     

CONCLUSION:     

COMMENTS:      Lexiscan stress test was performed under standard four minute protocol.  Radionuclide 
was injected one minute prior to ending the test. No electrocardiographic abormalities were present t
o suggest ischemia. Nuclear imaging and interpretation are pending.